# Patient Record
(demographics unavailable — no encounter records)

---

## 2025-01-08 NOTE — HISTORY OF PRESENT ILLNESS
[de-identified] : Patient is a 54 year old M with a PMHx of HTN, Rosacea coming in for annual physical.  Patient currently feels well and reports BP at home this morning was in 120s systolic.  Patient follows up with cardiology for BP management as well and was recently started on amlodipine one week ago.

## 2025-01-08 NOTE — HEALTH RISK ASSESSMENT
[Yes] : Yes [Monthly or less (1 pt)] : Monthly or less (1 point) [1 or 2 (0 pts)] : 1 or 2 (0 points) [Never (0 pts)] : Never (0 points) [No] : In the past 12 months have you used drugs other than those required for medical reasons? No [0] : 2) Feeling down, depressed, or hopeless: Not at all (0) [PHQ-2 Negative - No further assessment needed] : PHQ-2 Negative - No further assessment needed [Never] : Never [0-4] : 0-4 [Patient reported colonoscopy was normal] : Patient reported colonoscopy was normal [Audit-CScore] : 1 [HPH8Hpegg] : 0 [ColonoscopyDate] : 01/20

## 2025-01-08 NOTE — PHYSICAL EXAM
[facility] DIVISION OF KIDNEY DISEASES AND HYPERTENSION -- FOLLOW UP NOTE  --------------------------------------------------------------------------------  Chief Complaint: GIFTY    24 hour events/subjective: Feeling great. Says his edema has greatly improved and that he is "staying positive."        PAST HISTORY  --------------------------------------------------------------------------------  No significant changes to PMH, PSH, FHx, SHx, unless otherwise noted    ALLERGIES & MEDICATIONS  --------------------------------------------------------------------------------  Allergies    apple (Urticaria)  Bananas (Urticaria)  Grapes (Urticaria)  No Known Drug Allergies  Orange (Urticaria)    Intolerances      Standing Inpatient Medications  heparin  Injectable 5000 Unit(s) SubCutaneous every 8 hours  furosemide   Injectable 40 milliGRAM(s) IV Push daily  immune globulin gamma IVPB 30 Gram(s) IV Intermittent once    PRN Inpatient Medications  acetaminophen   Tablet. 650 milliGRAM(s) Oral once PRN  diphenhydrAMINE   Capsule 50 milliGRAM(s) Oral once PRN      REVIEW OF SYSTEMS  --------------------------------------------------------------------------------  Gen: no fatigue, fevers/chills, weakness  Skin: No rashes  Respiratory: No dyspnea, cough, wheezing, hemoptysis  CV: No chest pain, PND, orthopnea  GI: No abdominal pain, diarrhea, constipation, nausea, vomiting  : No dysuria, hematuria  MSK: No joint pain/swelling; no edema  Neuro: no confusion    VITALS/PHYSICAL EXAM  --------------------------------------------------------------------------------  T(C): 36.9 (07-31-17 @ 05:23), Max: 37.2 (07-30-17 @ 17:43)  HR: 46 (07-31-17 @ 05:23) (40 - 48)  BP: 142/74 (07-31-17 @ 05:23) (119/74 - 142/74)  RR: 18 (07-31-17 @ 05:23) (18 - 18)  SpO2: 98% (07-31-17 @ 05:23) (98% - 100%)  Wt(kg): --  Height (cm): 193.04 (07-29-17 @ 16:37)  Weight (kg): 93.4 (07-29-17 @ 16:37)  BMI (kg/m2): 25.1 (07-29-17 @ 16:37)  BSA (m2): 2.24 (07-29-17 @ 16:37)      07-30-17 @ 07:01  -  07-31-17 @ 07:00  --------------------------------------------------------  IN: 1620 mL / OUT: 0 mL / NET: 1620 mL    07-31-17 @ 07:01  -  07-31-17 @ 13:51  --------------------------------------------------------  IN: 480 mL / OUT: 1600 mL / NET: -1120 mL      Physical Exam:  	Gen: NAD, well-appearing AAM, in good spirits  	HEENT: supple neck; moist oral mucosa  	Pulm: CTA B/L  	CV: RRR, S1S2; no rub  	Back: No spinal or CVA tenderness; no sacral edema  	Abd: +BS, soft, nontender/nondistended  	: No suprapubic tenderness  	UE: Warm, FROM, no clubbing, intact strength; no edema  	LE: Warm, FROM, no clubbing, intact strength; mild ankle edema  	Neuro: No focal deficits  	Psych: Normal affect and mood  	Skin: Warm, without rashes  	  LABS/STUDIES  --------------------------------------------------------------------------------              10.2   4.51  >-----------<  247      [07-31-17 @ 07:11]              31.2     137  |  105  |  34  ----------------------------<  160      [07-31-17 @ 07:22]  4.7   |  23  |  1.93        Ca     8.0     [07-31-17 @ 07:22]      Mg     2.1     [07-31-17 @ 07:22]      Phos  4.1     [07-31-17 @ 07:22]    TPro  5.8  /  Alb  2.0  /  TBili  0.2  /  DBili  x   /  AST  22  /  ALT  28  /  AlkPhos  62  [07-31-17 @ 07:22]    PT/INR: PT 10.5 , INR 0.96       [07-30-17 @ 14:16]  PTT: 37.2       [07-30-17 @ 14:16]      Creatinine Trend:  SCr 1.93 [07-31 @ 07:22]  SCr 2.28 [07-30 @ 08:47]  SCr 2.20 [07-29 @ 21:34]  SCr 2.29 [07-29 @ 13:40] [Normal] : affect was normal and insight and judgment were intact

## 2025-06-07 NOTE — HISTORY OF PRESENT ILLNESS
[de-identified] : Patient is a 54 year old M with a PMHx of HTN, Rosacea coming in for follow up.  Patient reports he recently had BP medications adjusted and reports improvement of diet.